# Patient Record
Sex: FEMALE | Race: AMERICAN INDIAN OR ALASKA NATIVE
[De-identification: names, ages, dates, MRNs, and addresses within clinical notes are randomized per-mention and may not be internally consistent; named-entity substitution may affect disease eponyms.]

---

## 2019-11-24 ENCOUNTER — HOSPITAL ENCOUNTER (EMERGENCY)
Dept: HOSPITAL 5 - ED | Age: 24
Discharge: HOME | End: 2019-11-24
Payer: SELF-PAY

## 2019-11-24 VITALS — SYSTOLIC BLOOD PRESSURE: 132 MMHG | DIASTOLIC BLOOD PRESSURE: 84 MMHG

## 2019-11-24 DIAGNOSIS — F12.10: ICD-10-CM

## 2019-11-24 DIAGNOSIS — F10.10: ICD-10-CM

## 2019-11-24 DIAGNOSIS — Z98.890: ICD-10-CM

## 2019-11-24 DIAGNOSIS — J20.9: Primary | ICD-10-CM

## 2019-11-24 DIAGNOSIS — F17.200: ICD-10-CM

## 2019-11-24 DIAGNOSIS — Z79.899: ICD-10-CM

## 2019-11-24 PROCEDURE — 71046 X-RAY EXAM CHEST 2 VIEWS: CPT

## 2019-11-24 NOTE — EVENT NOTE
ED Screening Note


ED Screening Note: 





coughing up small amount of bright red blood for 2 months 


+night sweats 


no recent travel 


pt states she was incarcerated at 21 yo


no fever 


no SOB 


no CP





This initial assessment/diagnostic orders/clinical plan/treatment(s) is/are 

subject to change based on patients health status, clinical progression and re-

assessment by fellow clinical providers in the ED. Further treatment and workup 

at subsequent clinical providers discretion. Patient/guardian urged not to elope

from the ED as their condition may be serious if not clinically assessed and 

managed. 





Initial orders include: XR chest

## 2019-11-24 NOTE — XRAY REPORT
CHEST 2 VIEWS 



INDICATION / CLINICAL INFORMATION:

Hemoptysis for 2 months.



COMPARISON: 

None available.



FINDINGS:



SUPPORT DEVICES: None.

HEART / MEDIASTINUM: No significant abnormality. 

LUNGS / PLEURA: No significant pulmonary or pleural abnormality. No pneumothorax. 



ADDITIONAL FINDINGS: No significant additional findings.



IMPRESSION:

1. No acute abnormality of the chest.



Signer Name: Satinder Mtz MD 

Signed: 11/24/2019 5:27 PM

 Workstation Name: [id number]

## 2019-11-24 NOTE — EMERGENCY DEPARTMENT REPORT
Minor Respiratory





- HPI


Chief Complaint: Upper Respiratory Infection


Stated Complaint: VOMITING BLOOD


Time Seen by Provider: 11/24/19 16:56


Duration: 2 months


Pain Location: Chest


Severity: mild


Minor Respiratory: Yes Able to Tolerate Fluids, Yes Cough, Yes Hemoptysis, No 

Rhinorrhea, No Sore Throat, No Ear Pain, No Sick Contacts, No Chest Pain, No 

Shortness of Breath, No Fever


Other History: Eileen is a 25 yo female with hx of tobacco abuse who presents 

with 2 months of cough and bloody sputum.   Light brown sputum streaked with 

blood.  Denies wheezing.  Denies fever.  Denies chest pain.  Denies shortness of

breath.  History of incarceration 2 years ago.  Did admit to night sweats to my 

colleague.





ED Review of Systems


ROS: 


Stated complaint: VOMITING BLOOD


Other details as noted in HPI





Comment: All other systems reviewed and negative


Constitutional: denies: fever, malaise


Respiratory: cough.  denies: shortness of breath, wheezing


Cardiovascular: denies: chest pain


Gastrointestinal: denies: abdominal pain, nausea, vomiting





ED Past Medical Hx





- Past Medical History


Previous Medical History?: No





- Surgical History


Past Surgical History?: Yes


Additional Surgical History: Knees





- Social History


Smoking Status: Current Every Day Smoker


Substance Use Type: Alcohol, Marijuana





- Medications


Home Medications: 


                                Home Medications











 Medication  Instructions  Recorded  Confirmed  Last Taken  Type


 


Doxycycline Hyclate [Doxycycline 100 mg PO Q12HR 7 Days #14 tab 11/24/19  

Unknown Rx





Hyclate TAB]     














Minor Respiratory Exam





- Exam


General: 


Vital signs noted. No distress. Alert and acting appropriately.


General:  Well-appearing, no acute distress


HEENT: Normocephalic atraumatic  anicteric sclera 


  Nose: no rhinorrhea


  Oropharynx: Clear mucous membranes no lesions


Neck: supple, no meningismus


Chest: Clear to auscultation bilaterally no rales rhonchi no wheezes


Cardiac: Regular rate and rhythm no murmurs no rubs no gallops


Abdomen: Soft nontender nondistended positive bowel sounds no guarding


Extremities: No cyanosis no clubbing no edema


Neuro: Moves all extremities -4, no gross deficits


Psychiatric: Alert and oriented -4 normal affect normal judgment normal insight


HEENT: Yes Moist Mucous Membranes, No Pharyngeal Erythema, No Pharyngeal 

Exudates, No Rhinorrhea, No Conjuctival Injection, No Frontal Tenderness, No 

Maxillary Tenderness


Neck: Yes Supple, No Adenopathy


Lungs: Yes Good Air Exchange, No Wheezes, No Ronchi, No Stridor, No Cough, No 

Labored Respirations, No Retractions, No Use of Accessory Muscles, No Other 

Abnormal Lung Sounds


Heart: Yes Regular, No Murmur


Abdomen: Yes Normal Bowel Sounds, No Tenderness, No Peritoneal Signs


Skin: No Rash, No Edema


Neurologic: 


Alert and oriented, no deficits.








Musculoskeletal: 


Unremarkable.











ED Course


                                   Vital Signs











  11/24/19 11/24/19





  16:50 19:18


 


Temperature 98.2 F 


 


Pulse Rate 83 


 


Respiratory 18 15





Rate  


 


Blood Pressure 132/84 


 


O2 Sat by Pulse 98 





Oximetry  














ED Medical Decision Making





- Radiology Data


Radiology results: report reviewed





Chest radiographs: No acute process according to radiologist's impression





- Medical Decision Making





Acute bronchitis for 2 months duration, due to tobacco abuse antibiotics 

indicated.  Prescribed doxycycline.  Recommended smoking cessation.


Critical care attestation.: 


If time is entered above; I have spent that time in minutes in the direct care 

of this critically ill patient, excluding procedure time.








ED Disposition


Clinical Impression: 


 Acute bronchitis





Disposition: DC-01 TO HOME OR SELFCARE


Is pt being admited?: No


Does the pt Need Aspirin: No


Condition: Stable


Instructions:  Acute Bronchitis (ED)


Prescriptions: 


Doxycycline Hyclate [Doxycycline Hyclate TAB] 100 mg PO Q12HR 7 Days #14 tab


Referrals: 


[facility] [Outside] - as needed


Forms:  Work/School Release Form(ED)

## 2021-07-01 ENCOUNTER — HOSPITAL ENCOUNTER (EMERGENCY)
Dept: HOSPITAL 5 - ED | Age: 26
Discharge: HOME | End: 2021-07-01
Payer: SELF-PAY

## 2021-07-01 VITALS — SYSTOLIC BLOOD PRESSURE: 120 MMHG | DIASTOLIC BLOOD PRESSURE: 83 MMHG

## 2021-07-01 DIAGNOSIS — J45.909: ICD-10-CM

## 2021-07-01 DIAGNOSIS — Z79.899: ICD-10-CM

## 2021-07-01 DIAGNOSIS — J02.8: Primary | ICD-10-CM

## 2021-07-01 DIAGNOSIS — B96.89: ICD-10-CM

## 2021-07-01 DIAGNOSIS — F17.200: ICD-10-CM

## 2021-07-01 DIAGNOSIS — F12.90: ICD-10-CM

## 2021-07-01 PROCEDURE — 99282 EMERGENCY DEPT VISIT SF MDM: CPT

## 2021-07-01 NOTE — EMERGENCY DEPARTMENT REPORT
ED General Adult HPI





- General


Chief complaint: Neck Pain/Injury


Stated complaint: SORE THROAT


Time Seen by Provider: 07/01/21 14:20


Source: patient


Mode of arrival: Ambulatory


Limitations: No Limitations





- History of Present Illness


Initial comments: 





25-year-old -American female patient presents with complaints of sore 

throat x1 week.  She states the pain started after she playing around with her 

children and got a crick in her neck.  Pain has been worsening since and she now

rates her pain as a 10/10 in severity.  She also states the pain worsens with 

swallowing.  No fever/chills/sweats per patient.  She denies any past medical 

history.


-: Gradual


Severity scale (0 -10): 10





- Related Data


                                  Previous Rx's











 Medication  Instructions  Recorded  Last Taken  Type


 


Doxycycline Hyclate [Doxycycline 100 mg PO Q12HR 7 Days #14 tab 11/24/19 Unknown

 Rx





Hyclate TAB]    


 


Amoxicillin [Trimox CAP] 500 mg PO BID 10 Days #20 capsule 07/01/21 Unknown Rx


 


Ibuprofen [Motrin 800 MG tab] 800 mg PO Q8HR PRN #21 tablet 07/01/21 Unknown Rx











                                    Allergies











Allergy/AdvReac Type Severity Reaction Status Date / Time


 


No Known Allergies Allergy   Unverified 11/24/19 16:54














ED Review of Systems


ROS: 


Stated complaint: SORE THROAT


Other details as noted in HPI





Constitutional: denies: diaphoresis, fever, malaise


ENT: throat pain


Respiratory: denies: cough, shortness of breath


Cardiovascular: denies: chest pain


Skin: denies: rash, lesions


Hematological/Lymphatic: denies: swollen glands





ED Past Medical Hx





- Past Medical History


Previous Medical History?: Yes


Hx Asthma: Yes





- Surgical History


Past Surgical History?: Yes


Additional Surgical History: Knees





- Social History


Smoking Status: Current Every Day Smoker


Substance Use Type: Alcohol, Marijuana





- Medications


Home Medications: 


                                Home Medications











 Medication  Instructions  Recorded  Confirmed  Last Taken  Type


 


Doxycycline Hyclate [Doxycycline 100 mg PO Q12HR 7 Days #14 tab 11/24/19  

Unknown Rx





Hyclate TAB]     


 


Amoxicillin [Trimox CAP] 500 mg PO BID 10 Days #20 capsule 07/01/21  Unknown Rx


 


Ibuprofen [Motrin 800 MG tab] 800 mg PO Q8HR PRN #21 tablet 07/01/21  Unknown Rx














ED Physical Exam





- General


Limitations: No Limitations


General appearance: alert, in no apparent distress





- Head


Head exam: Present: atraumatic, normocephalic





- Eye


Eye exam: Present: normal appearance





- Expanded ENT Exam


  ** Expanded


Mouth exam: Absent: drooling, trismus, muffled voice


Throat exam: Positive: tonsillar erythema, tonsillomegaly, tonsillar exudate 

(Bilateral), other (Uvula is midline).  Negative: R peritonsillar mass, L 

peritonsillar mass





- Neck


Neck exam: Present: full ROM, lymphadenopathy (Mild anterior cervical)





- Respiratory


Respiratory exam: Present: normal lung sounds bilaterally.  Absent: respiratory 

distress





- Cardiovascular


Cardiovascular Exam: Present: regular rate, normal rhythm.  Absent: systolic 

murmur, diastolic murmur, rubs, gallop





- Neurological Exam


Neurological exam: Present: alert, oriented X3





- Psychiatric


Psychiatric exam: Present: normal affect, normal mood





- Skin


Skin exam: Present: warm, dry, intact, normal color.  Absent: rash





ED Course





                                   Vital Signs











  07/01/21





  14:16


 


Temperature 99.0 F


 


Pulse Rate 86


 


Respiratory 18





Rate 


 


Blood Pressure 120/83





[Right] 


 


O2 Sat by Pulse 99





Oximetry 














ED Medical Decision Making





- Medical Decision Making








25-year-old -American female patient presents with complaints of sore 

throat x1 week.  She states the pain started after she playing around with her 

children and got a crick in her neck.  Pain has been worsening since and she now

 rates her pain as a 10/10 in severity.  She also states the pain worsens with 

swallowing.  No fever/chills/sweats per patient.  She denies any past medical 

history.








Acute bacterial pharyngitis noted on exam.  Will treat for strep with Amoxil.  

Recommend follow-up with PCP in 3 to 5 days.  Patient vitals are normal, she is 

well-appearing, she is stable for discharge home.  Strict return precautions 

discussed in detail patient verbalized understanding


Critical care attestation.: 


If time is entered above; I have spent that time in minutes in the direct care 

of this critically ill patient, excluding procedure time.








ED Disposition


Clinical Impression: 


 Acute bacterial pharyngitis





Disposition: DC-01 TO HOME OR SELFCARE


Is pt being admited?: No


Condition: Stable


Instructions:  Strep Throat, Adult, Easy-to-Read


Prescriptions: 


Ibuprofen [Motrin 800 MG tab] 800 mg PO Q8HR PRN #21 tablet


 PRN Reason: pain


Amoxicillin [Trimox CAP] 500 mg PO BID 10 Days #20 capsule


Referrals: 


[facility] [Provider Group] - 3-5 Days